# Patient Record
Sex: MALE | Race: BLACK OR AFRICAN AMERICAN | NOT HISPANIC OR LATINO | Employment: UNEMPLOYED | URBAN - METROPOLITAN AREA
[De-identification: names, ages, dates, MRNs, and addresses within clinical notes are randomized per-mention and may not be internally consistent; named-entity substitution may affect disease eponyms.]

---

## 2024-02-22 ENCOUNTER — HOSPITAL ENCOUNTER (EMERGENCY)
Facility: HOSPITAL | Age: 34
Discharge: HOME | End: 2024-02-22
Attending: EMERGENCY MEDICINE

## 2024-02-22 ENCOUNTER — APPOINTMENT (EMERGENCY)
Dept: CT IMAGING | Facility: HOSPITAL | Age: 34
End: 2024-02-22

## 2024-02-22 VITALS
RESPIRATION RATE: 17 BRPM | SYSTOLIC BLOOD PRESSURE: 150 MMHG | OXYGEN SATURATION: 98 % | DIASTOLIC BLOOD PRESSURE: 95 MMHG | HEART RATE: 49 BPM | WEIGHT: 201 LBS | TEMPERATURE: 98.2 F

## 2024-02-22 DIAGNOSIS — K04.7 DENTAL INFECTION: Primary | ICD-10-CM

## 2024-02-22 DIAGNOSIS — K02.9 DENTAL CARIES: ICD-10-CM

## 2024-02-22 DIAGNOSIS — K04.01 PULPITIS: ICD-10-CM

## 2024-02-22 DIAGNOSIS — K08.89 DENTALGIA: ICD-10-CM

## 2024-02-22 DIAGNOSIS — T39.1X1A ACETAMINOPHEN TOXICITY: ICD-10-CM

## 2024-02-22 LAB
ALBUMIN SERPL BCP-MCNC: 4.4 G/DL (ref 3.5–5)
ALP SERPL-CCNC: 69 U/L (ref 34–104)
ALT SERPL W P-5'-P-CCNC: 22 U/L (ref 7–52)
AMPHETAMINES SERPL QL SCN: NEGATIVE
ANION GAP SERPL CALCULATED.3IONS-SCNC: 5 MMOL/L
APAP SERPL-MCNC: 10 UG/ML (ref 10–20)
APTT PPP: 30 SECONDS (ref 23–37)
AST SERPL W P-5'-P-CCNC: 16 U/L (ref 13–39)
ATRIAL RATE: 69 BPM
BARBITURATES UR QL: NEGATIVE
BASOPHILS # BLD AUTO: 0.05 THOUSANDS/ÂΜL (ref 0–0.1)
BASOPHILS NFR BLD AUTO: 1 % (ref 0–1)
BENZODIAZ UR QL: NEGATIVE
BILIRUB DIRECT SERPL-MCNC: 0.08 MG/DL (ref 0–0.2)
BILIRUB SERPL-MCNC: 0.37 MG/DL (ref 0.2–1)
BUN SERPL-MCNC: 11 MG/DL (ref 5–25)
CALCIUM SERPL-MCNC: 10.1 MG/DL (ref 8.4–10.2)
CHLORIDE SERPL-SCNC: 107 MMOL/L (ref 96–108)
CO2 SERPL-SCNC: 27 MMOL/L (ref 21–32)
COCAINE UR QL: NEGATIVE
CREAT SERPL-MCNC: 0.96 MG/DL (ref 0.6–1.3)
EOSINOPHIL # BLD AUTO: 0.39 THOUSAND/ÂΜL (ref 0–0.61)
EOSINOPHIL NFR BLD AUTO: 5 % (ref 0–6)
ERYTHROCYTE [DISTWIDTH] IN BLOOD BY AUTOMATED COUNT: 13.2 % (ref 11.6–15.1)
ETHANOL SERPL-MCNC: <10 MG/DL
GFR SERPL CREATININE-BSD FRML MDRD: 101 ML/MIN/1.73SQ M
GLUCOSE SERPL-MCNC: 123 MG/DL (ref 65–140)
HCT VFR BLD AUTO: 45.7 % (ref 36.5–49.3)
HGB BLD-MCNC: 15.5 G/DL (ref 12–17)
IMM GRANULOCYTES # BLD AUTO: 0.02 THOUSAND/UL (ref 0–0.2)
IMM GRANULOCYTES NFR BLD AUTO: 0 % (ref 0–2)
INR PPP: 0.89 (ref 0.84–1.19)
LYMPHOCYTES # BLD AUTO: 1.57 THOUSANDS/ÂΜL (ref 0.6–4.47)
LYMPHOCYTES NFR BLD AUTO: 20 % (ref 14–44)
MCH RBC QN AUTO: 31.6 PG (ref 26.8–34.3)
MCHC RBC AUTO-ENTMCNC: 33.9 G/DL (ref 31.4–37.4)
MCV RBC AUTO: 93 FL (ref 82–98)
METHADONE UR QL: NEGATIVE
MONOCYTES # BLD AUTO: 0.53 THOUSAND/ÂΜL (ref 0.17–1.22)
MONOCYTES NFR BLD AUTO: 7 % (ref 4–12)
NEUTROPHILS # BLD AUTO: 5.5 THOUSANDS/ÂΜL (ref 1.85–7.62)
NEUTS SEG NFR BLD AUTO: 67 % (ref 43–75)
NRBC BLD AUTO-RTO: 0 /100 WBCS
OPIATES UR QL SCN: NEGATIVE
OXYCODONE+OXYMORPHONE UR QL SCN: NEGATIVE
P AXIS: 38 DEGREES
PCP UR QL: NEGATIVE
PLATELET # BLD AUTO: 269 THOUSANDS/UL (ref 149–390)
PMV BLD AUTO: 8.9 FL (ref 8.9–12.7)
POTASSIUM SERPL-SCNC: 4.2 MMOL/L (ref 3.5–5.3)
PR INTERVAL: 156 MS
PROT SERPL-MCNC: 6.7 G/DL (ref 6.4–8.4)
PROTHROMBIN TIME: 12.7 SECONDS (ref 11.6–14.5)
QRS AXIS: 71 DEGREES
QRSD INTERVAL: 94 MS
QT INTERVAL: 394 MS
QTC INTERVAL: 422 MS
RBC # BLD AUTO: 4.9 MILLION/UL (ref 3.88–5.62)
SALICYLATES SERPL-MCNC: <5 MG/DL (ref 3–20)
SODIUM SERPL-SCNC: 139 MMOL/L (ref 135–147)
T WAVE AXIS: 41 DEGREES
THC UR QL: POSITIVE
TSH SERPL DL<=0.05 MIU/L-ACNC: 1.07 UIU/ML (ref 0.45–4.5)
VENTRICULAR RATE: 69 BPM
WBC # BLD AUTO: 8.06 THOUSAND/UL (ref 4.31–10.16)

## 2024-02-22 PROCEDURE — 36415 COLL VENOUS BLD VENIPUNCTURE: CPT

## 2024-02-22 PROCEDURE — 85025 COMPLETE CBC W/AUTO DIFF WBC: CPT

## 2024-02-22 PROCEDURE — 80076 HEPATIC FUNCTION PANEL: CPT | Performed by: EMERGENCY MEDICINE

## 2024-02-22 PROCEDURE — 85610 PROTHROMBIN TIME: CPT

## 2024-02-22 PROCEDURE — 80307 DRUG TEST PRSMV CHEM ANLYZR: CPT

## 2024-02-22 PROCEDURE — 82077 ASSAY SPEC XCP UR&BREATH IA: CPT

## 2024-02-22 PROCEDURE — 80179 DRUG ASSAY SALICYLATE: CPT

## 2024-02-22 PROCEDURE — 99283 EMERGENCY DEPT VISIT LOW MDM: CPT

## 2024-02-22 PROCEDURE — 80143 DRUG ASSAY ACETAMINOPHEN: CPT

## 2024-02-22 PROCEDURE — 85730 THROMBOPLASTIN TIME PARTIAL: CPT

## 2024-02-22 PROCEDURE — 96374 THER/PROPH/DIAG INJ IV PUSH: CPT

## 2024-02-22 PROCEDURE — 93010 ELECTROCARDIOGRAM REPORT: CPT | Performed by: INTERNAL MEDICINE

## 2024-02-22 PROCEDURE — 80048 BASIC METABOLIC PNL TOTAL CA: CPT

## 2024-02-22 PROCEDURE — 93005 ELECTROCARDIOGRAM TRACING: CPT

## 2024-02-22 PROCEDURE — 84443 ASSAY THYROID STIM HORMONE: CPT

## 2024-02-22 PROCEDURE — 96361 HYDRATE IV INFUSION ADD-ON: CPT

## 2024-02-22 PROCEDURE — 70487 CT MAXILLOFACIAL W/DYE: CPT

## 2024-02-22 PROCEDURE — 70450 CT HEAD/BRAIN W/O DYE: CPT

## 2024-02-22 PROCEDURE — 99285 EMERGENCY DEPT VISIT HI MDM: CPT

## 2024-02-22 RX ORDER — NAPROXEN 500 MG/1
500 TABLET ORAL 2 TIMES DAILY WITH MEALS
Qty: 30 TABLET | Refills: 0 | Status: SHIPPED | OUTPATIENT
Start: 2024-02-22 | End: 2024-02-23

## 2024-02-22 RX ORDER — KETOROLAC TROMETHAMINE 30 MG/ML
15 INJECTION, SOLUTION INTRAMUSCULAR; INTRAVENOUS ONCE
Status: COMPLETED | OUTPATIENT
Start: 2024-02-22 | End: 2024-02-22

## 2024-02-22 RX ORDER — TETRACAINE HYDROCHLORIDE 5 MG/ML
1 SOLUTION OPHTHALMIC ONCE
Status: COMPLETED | OUTPATIENT
Start: 2024-02-22 | End: 2024-02-22

## 2024-02-22 RX ORDER — AMOXICILLIN AND CLAVULANATE POTASSIUM 875; 125 MG/1; MG/1
1 TABLET, FILM COATED ORAL EVERY 12 HOURS
Qty: 14 TABLET | Refills: 0 | Status: SHIPPED | OUTPATIENT
Start: 2024-02-22 | End: 2024-02-23

## 2024-02-22 RX ORDER — KETAMINE HYDROCHLORIDE 100 MG/ML
1 INJECTION, SOLUTION INTRAMUSCULAR; INTRAVENOUS ONCE
Status: COMPLETED | OUTPATIENT
Start: 2024-02-22 | End: 2024-02-22

## 2024-02-22 RX ADMIN — KETOROLAC TROMETHAMINE 15 MG: 30 INJECTION, SOLUTION INTRAMUSCULAR at 08:25

## 2024-02-22 RX ADMIN — TETRACAINE HYDROCHLORIDE 1 DROP: 5 SOLUTION OPHTHALMIC at 11:16

## 2024-02-22 RX ADMIN — SODIUM CHLORIDE 1000 ML: 0.9 INJECTION, SOLUTION INTRAVENOUS at 11:01

## 2024-02-22 RX ADMIN — IOHEXOL 100 ML: 350 INJECTION, SOLUTION INTRAVENOUS at 08:32
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
## 2024-02-22 NOTE — CONSULTS
INTERPROFESSIONAL (PHONE) CONSULTATION - Medical Toxicology  Lisbeth Ramsey 33 y.o. male MRN: [id number]  Unit/Bed#: ED 09 Encounter: [id number]      Reason for Consult / Principal Problem: Detectable acetaminophen level    Inpatient consult to Toxicology  Consult performed by: Tracy Sorensen MD  Consult ordered by: Triston Velasquez PA-C        02/22/24    ASSESSMENT:  Dental pain  Detectable acetaminophen level    RECOMMENDATIONS:  Continue supportive care measures, including airway monitoring, head of bed elevation, aspiration precautions, cardiac telemetry, and continuous pulse oximetry.    Patient's initial acetaminophen level is 10, and LFTs are normal. No reported history of repeated supra-therapeutic acetaminophen use prior to ED arrival. No need for N-acetylcysteine at this time.    For further questions, please contact the medical  on call via [place] [facility] between 8am and 9pm. If between 9pm and 8am, please reach out to the Poison Center at [phone].     Please see additional teaching note below:    Medical Toxicology Teaching Note  [facility]  Acetaminophen Toxicity  Last revised October 2017     Acetaminophen (Tylenol) is a nonopiod analgesic and antipyretic medication found in many over-the-counter and prescription products such as Tylenol PM, Norco, Percocet, Nyquil, Vicks Formula 44-D. The recommended maximum daily dose of acetaminophen for adults is 3g/day, and 75-90mg/kg/day for children. Alcoholics may safely take Tylenol in therapeutic doses, but they may be at increased risk for hepatotoxicity in overdose.     Mechanism of Toxicity: Acetaminophen is primarily metabolized by the liver. In therapeutic doses, about 90% of acetaminophen is conjugated to nontoxic metabolites (glucoronides and sulfates). A small portion (<5%) is conjugated by cytochrome P450 enzyme, subunit CYP2E1, to a toxic metabolite, N-acetyl-p-benzoquinoneimine (NAPQI). This  metabolite is further conjugated by glutathione, to nontoxic metabolites eliminated by the kidneys.   Liver Injury:  In toxic doses, the usual metabolic pathways are overwhelmed; acetaminophen is shunted to the cytochrome P450 pathway, creating NAPQI. Glutathione stores are depleted and NAPQI is produced. Cellular injury and hepatic necrosis may occur as NAPQI accumulates.   Renal Injury:  Cytochrome P450 activity in the kidneys is thought to cause direct renal damage. Renal insufficiency may also develop during fulminant hepatic failure due to hepatorenal syndrome. Renal toxicity is usually associated with liver injury.   Pharmacokinetics:  Acetaminophen is rapidly absorbed. Peak levels occur within  minutes with normal doses. Delayed absorption may occur with sustained release products or with co-ingestions that slow the GI tract (opiods, anticholinergics). The elimination half-life is 1-3 hours after therapeutic doses and may extend to 12 hours after overdose.   Toxic Dose:  Toxicity in adults may occur with acute ingestions of 7g, and 200mg/kg in children. Hepatic injury following chronic ingestions may occur at any dose above the daily recommended dose.     Clinical Presentation:    Acute Ingestion: Within 8 hrs of an acute ingestion, there are usually few symptoms. Between 8-30 hours after a toxic, acute ingestion, a transaminitis will develop. Nausea, vomiting, and right upper quadrant pain may occur. Within 12-36 hours, worsening AST/ALT develops with elevated bilirubin and INR. The most severe cases will develop fulminant liver failure with hepatic encephalopathy and acidosis, usually within 3-7 days post overdose. The patient should be evaluated for a liver transplantation.   Repeated Supra-therapeutic Ingestion: Due to a sub-acute course, patients may present anywhere along a spectrum - normal LFTS to asymptomatic elevation of enzymes to hepatic failure.     Diagnosis   Acute Ingestion (Time of  Ingestion Known): After an acute ingestion at a known time, obtain a 4-hour post-ingestion serum acetaminophen level and plot the level on the Rumack-Bart’s nomogram (see below). This nomogram is used to predict the likelihood of hepatic toxicity based on the level of acetaminophen between 4 and 24 hours post-ingestion. The nomogram CANNOT be used if the time of ingestion is unknown.   The dotted line (Rumack-Bart line), marking a 4-hour level at 200 mcg/ml, is the original line developed from the study above which hepatic toxicity will probably occur. The solid line (Treatment Line), marking a 4-hour level at 150ug/ml. is the treatment line accepted as the standard of care in the [place] and is 25% lower as a safety margin. If the patient’s serum APAP level falls above the treatment line, start treatment with N-acetylcysteine (NAC). (see Treatment below)           Acute Ingestion (Time of Ingestion Unknown) or Repeated Supra-therapeutic Ingestion An acetaminophen level CANNOT be plotted on the Rumack-Bart’s nomogram. Draw an APAP level and AST/ALT at time of presentation. Anyone with an APAP level> 10mcg/ml OR elevated AST/ALT should start NAC. (see Treatment below)     TREATMENT   Emergency and Supportive Care: Treat nausea and vomiting to protect airway and support safe administration of charcoal and NAC, when indicated (see below). Provide standard supportive care for liver and renal failure. Contact liver transplant team if fulminant hepatic failure occurs.   Decontamination:  Administer activated charcoal within 2 hours of ingestion (consider later if extended release preparations). Use antiemetics for nausea. Activated charcoal does bind to NAC, but the effect is not thought to be clinically significant. Gastric emptying is not recommended.   Specific Drugs and Antidotes.   Acute Ingestion Treat with NAC if the APAP level falls above the Treatment Line on the nomogram. The maximal benefit occurs  if given within 8 hours of acute ingestion. Therefore, it is recommended to empirically start NAC before a level is obtained if there is a reasonable concern of a toxic ingestion presenting close to 8 hours or beyond. In late presenters (>8hrs), start NAC and treat for a full course or longer if LFTS remain abnormal. Treatment maybe stopped when AST/ALT peak and then downtrend, with an INR <2 and patient is clinically well. If abnormal labs persist, continue NAC and call Toxicology. There are two routes of administration for NAC, oral and IV. The treatment protocols are described below.   Acute Ingestion (Time of Ingestion Unknown) or Repeated Supra-therapeutic Ingestion   The nomogram CANNOT be used to estimate the risk of hepatotoxicity. At presentation, check a serum APAP level and AST/ALT. If the APAP level is above 10 mcg/ml or the AST/ALT are elevated, start NAC treatment for 12 hours. If abnormalities persist, continue NAC treatment and call toxicology. If the APAP level is undetectable and AST and ALT are downtrending at the end of 12 hours, treatment may be stopped.     Intravenous (Acetadote)   Loading dose- 150mg/kg infused over 15-60 minutes   Maintenance Infusion #1- 50mg/kg (12.5mg/kg/hr) over 4 hours   Maintenance Infusion #2 -100mg/kg (6.25 mg/kg/hr) until treatment endpoint   Treatment Endpoint: 20 hours or more   NAC should be continued for the full course.   NAC can be stopped when APAP is undetectable, AST/ALT have peaked and are downtrending, and patient appears clinically well. Consultation with a medical  /poison center is recommended before changes in the duration of therapy are made.     Acetaminophen Toxicity Do’s and Don’ts   Acute Ingestions   DO give charcoal for decontamination within 2 hours of ingestion if the patient can adequately protect their airway.   DO start NAC empirically, i.e. without an APAP level, if the ingestion is likely a large overdose presenting at 8  hours or more after ingestion.   DO contact the Liver Transplant Team early if liver failure is developing.   DO NOT get a level before 4hrs post-ingestion if the time of ingestion is certain in an acute overdose.   DO NOT stop NAC therapy until full course is finished or truncated therapy is recommended by the Poison Center.   Repeated Supra-Therapeutic Ingestions (RSI)   DO ask patients with pain complaints (toothaches, back pain, cancer) about the amount of acetaminophen they use.   DO NOT use the Rumack-Ramesh nomogram to determine if the APAP level is toxic.   DO NOT stop NAC therapy until full course is finished or truncated therapy is recommended by the Poison Center.   NAC Protocols   DO stop IV NAC if an anaphylactoid reaction occurs (rare). Treat the reaction appropriately and call the Poison Center for recommendations on continued NAC therapy.   DO give charcoal with oral NAC when charcoal is indicated.   References   Brandi GREENWOOD Acetaminophen. In Brandi GREENWOOD, Owen PATE, Efraín FELTON et al eds. Medical Toxicology 3rd edition. [place]: [employer], 2004: pp.723-737.   Yash ROJAS Acetaminophen. In Yash ROJAS     Hx and PE limited by the dynamics of a phone consultation. I have not personally interviewed or evaluated the patient, but only advised based on the information provided to me. Primary provider is responsible for all clinical decisions.     Pertinent history, physical exam and clinical findings and course discussed: Lisbeth Ramsey is a 33 y.o. year old male who presents with dental pain. APAP level 10.     Review of systems and physical exam not performed by me.    Historical Information   History reviewed. No pertinent past medical history.  History reviewed. No pertinent surgical history.  Social History   Social History     Substance and Sexual Activity   Alcohol Use None     Social History     Substance and Sexual Activity   Drug Use Not on file     Social History     Tobacco Use  "  Smoking Status Not on file   Smokeless Tobacco Not on file     History reviewed. No pertinent family history.     Prior to Admission medications    Not on File       Current Facility-Administered Medications   Medication Dose Route Frequency    sodium chloride 0.9 % bolus 1,000 mL  1,000 mL Intravenous Once       Not on File    Objective     No intake or output data in the 24 hours ending 02/22/24 1126    Invasive Devices:   Peripheral IV 02/22/24 Left Antecubital (Active)       Vitals   Vitals:    02/22/24 0749 02/22/24 0844 02/22/24 0900 02/22/24 1000   BP: (!) 189/123 157/91 158/93 160/81   TempSrc: Tympanic      Pulse: 85 62 (!) 54 59   Resp: 16 12 16 15   Patient Position - Orthostatic VS: Sitting  Sitting    Temp: 98.2 °F (36.8 °C)          EKG, Pathology, and/or Other Studies: I have personally reviewed pertinent reports.        Lab Results: I have personally reviewed pertinent reports.      Labs:    Results from last 7 days   Lab Units 02/22/24  0824   WBC Thousand/uL 8.06   HEMOGLOBIN g/dL 15.5   HEMATOCRIT % 45.7   PLATELETS Thousands/uL 269   NEUTROS PCT % 67   LYMPHS PCT % 20   MONOS PCT % 7   EOS PCT % 5      Results from last 7 days   Lab Units 02/22/24  0824   SODIUM mmol/L 139   POTASSIUM mmol/L 4.2   CHLORIDE mmol/L 107   CO2 mmol/L 27   BUN mg/dL 11   CREATININE mg/dL 0.96   CALCIUM mg/dL 10.1   ALK PHOS U/L 69   ALT U/L 22   AST U/L 16      Results from last 7 days   Lab Units 02/22/24  0824   INR  0.89   PTT seconds 30         No results found for: \"TROPONINI\"      Results from last 7 days   Lab Units 02/22/24  0824   ACETAMINOPHEN LVL ug/mL 10   ETHANOL LVL mg/dL <10   SALICYLATE LVL mg/dL <5     Invalid input(s): \"EXTPREGUR\"      Imaging Studies: I have personally reviewed pertinent reports.      Counseling / Coordination of Care  Total time spent today 11 minutes. This was a phone consultation; greater than 50% of time spent in discussion with primary provider and coordination of care.   "

## 2024-02-22 NOTE — Clinical Note
Lisbeth Ramsey was seen and treated in our emergency department on 2/22/2024.                Diagnosis: Pulpitis/dentalgia    Lisbeth  may return to work on return date.    He may return on this date:          If you have any questions or concerns, please don't hesitate to call.      Triston Velasquez PA-C    ______________________________           _______________          _______________  Hospital Representative                              Date                                Time

## 2024-02-22 NOTE — DISCHARGE INSTRUCTIONS
[facility] follow-up PCP for ED visit today patient patient advised to return to the ED with any worsening symptoms, worsening pain, worsening swelling, headaches, fevers, chest pain, shortness of breath, worsening edema or erythema, purulent drainage.  Ambulatory referral to dentistry made for patient.

## 2024-02-22 NOTE — ED NOTES
D/c instructions reviewed with pt. Pt verbalizes understanding of same. All questions answered. IV pulled at this time. Pt collected all belongings and ambulated out of dept at this time.      Litzy Collazo RN  02/22/24 8851

## 2024-02-22 NOTE — ED TRIAGE NOTES
EMS reports called for dental pain, pt was crying in pain, unable to follow commands. EMS gave pt 30mg of ketamine en route, pt arrived acting erratically unable to answer questions due to pt rambling regarding minecraft.

## 2024-02-23 RX ORDER — NAPROXEN 500 MG/1
500 TABLET ORAL 2 TIMES DAILY WITH MEALS
Qty: 30 TABLET | Refills: 0 | Status: SHIPPED | OUTPATIENT
Start: 2024-02-23

## 2024-02-23 RX ORDER — AMOXICILLIN AND CLAVULANATE POTASSIUM 875; 125 MG/1; MG/1
1 TABLET, FILM COATED ORAL EVERY 12 HOURS
Qty: 14 TABLET | Refills: 0 | Status: SHIPPED | OUTPATIENT
Start: 2024-02-23 | End: 2024-03-01